# Patient Record
Sex: MALE | Race: WHITE | NOT HISPANIC OR LATINO | URBAN - NONMETROPOLITAN AREA
[De-identification: names, ages, dates, MRNs, and addresses within clinical notes are randomized per-mention and may not be internally consistent; named-entity substitution may affect disease eponyms.]

---

## 2021-01-15 NOTE — PATIENT DISCUSSION
Patient is here for suture removal.  The patient has a normal amount of bruising and swelling consistent with the post operative course. The suture lines are intact, there is no evidence of infection. The plan is to remove the sutures today with an expectation of normal healing.   The post op course has been further reviewed with the patient

## 2021-06-11 NOTE — PATIENT DISCUSSION
BOTOX for COSMETIC (30 units): The patient presented with multiple facial rhytids after informed consent the patient was treated with Botox at a dosage documented on [employer] in this chart. The patient tolerated the procedure well.

## 2021-07-13 ENCOUNTER — IMPORTED ENCOUNTER (OUTPATIENT)
Dept: URBAN - NONMETROPOLITAN AREA CLINIC 1 | Facility: CLINIC | Age: 51
End: 2021-07-13

## 2021-07-13 PROBLEM — H25.813: Noted: 2021-07-13

## 2021-07-13 PROBLEM — H52.4: Noted: 2021-07-13

## 2021-07-13 PROCEDURE — 92004 COMPRE OPH EXAM NEW PT 1/>: CPT

## 2021-07-13 PROCEDURE — 92015 DETERMINE REFRACTIVE STATE: CPT

## 2021-07-13 NOTE — PATIENT DISCUSSION
Presbyopia-Discussed diagnosis with patient. Updated spec Rx given. Recommend lens that will provide comfort as well as protect safety and health of eyes. Cataract OU-Not yet surgical. -Reviewed symptoms of advancing cataract growth such as glare and halos and decreased vision.-Continue to monitor for now.  Pt will notify us if any new symptoms develop.; Dr's Notes: sees   Dr. Nikki Storey

## 2022-04-09 ASSESSMENT — TONOMETRY
OD_IOP_MMHG: 17
OS_IOP_MMHG: 15

## 2022-07-13 ENCOUNTER — ESTABLISHED PATIENT (OUTPATIENT)
Dept: RURAL CLINIC 2 | Facility: CLINIC | Age: 52
End: 2022-07-13

## 2022-07-13 DIAGNOSIS — H52.01: ICD-10-CM

## 2022-07-13 DIAGNOSIS — H52.4: ICD-10-CM

## 2022-07-13 PROCEDURE — 92015 DETERMINE REFRACTIVE STATE: CPT

## 2022-07-13 PROCEDURE — 92014 COMPRE OPH EXAM EST PT 1/>: CPT

## 2022-07-13 ASSESSMENT — VISUAL ACUITY
OU_SC: 20/30
OS_SC: 20/20
OD_SC: 20/30

## 2022-07-13 ASSESSMENT — TONOMETRY
OD_IOP_MMHG: 19
OS_IOP_MMHG: 18

## 2024-09-18 ENCOUNTER — COMPREHENSIVE EXAM (OUTPATIENT)
Dept: RURAL CLINIC 1 | Facility: CLINIC | Age: 54
End: 2024-09-18

## 2024-09-18 DIAGNOSIS — H52.01: ICD-10-CM

## 2024-09-18 DIAGNOSIS — H52.4: ICD-10-CM

## 2024-09-18 PROCEDURE — 92015 DETERMINE REFRACTIVE STATE: CPT

## 2024-09-18 PROCEDURE — 92014 COMPRE OPH EXAM EST PT 1/>: CPT
